# Patient Record
(demographics unavailable — no encounter records)

---

## 2024-10-20 NOTE — REASON FOR VISIT
[Symptom and Test Evaluation] : symptom and test evaluation [Cardiac Failure] : cardiac failure [Arrhythmia/ECG Abnorrmalities] : arrhythmia/ECG abnormalities [Structural Heart and Valve Disease] : structural heart and valve disease [Hypertension] : hypertension [Other: ____] : [unfilled] [Formal Caregiver] : formal caregiver

## 2024-10-21 NOTE — HISTORY OF PRESENT ILLNESS
[FreeTextEntry1] : Patient who is mentally challenged with hx of ,Abnormal EKG, repaired Ventricular septal defect , hypertension ,cardiomyopathy with severe systolic dysfunction, normal coronaries sep 2019 ,AICD  july 16 2020 brought in for follow up  says she is feeling fine , does not eat much , patient is not very communicative ,, does not sleep well as per caregiver ,  patient recent AICD interrogation showed  45 minutes episode of atrial fibrillation , with controlled rate ,  ( patient has always she mild ache at sight of AICD placement location , which is chronic ,)  her weight stable , but denies any orthopnea , taking lasix 40 mg po daily    Patient had echo showed   EF 25-30%   increased RVSP compared to prior    denies any orthopnea   cardiac catheterization showed normal coronaries with severely decreased EF, increase pulmonary pressure,  Patient blood pressure improved on increased coreg dose   her blood work done in  May 2024  normal lipids , HB a1c 5.6   AICD was interrogated on May 17 2024   under the care of hematologist for anemia, receiving iron injection her hemoglobin  9.4 decreased from 10.7   MCV 77

## 2024-10-21 NOTE — ASSESSMENT
[FreeTextEntry1] : PAF : recently detected on AICD interrogation 45 minutes long high chadvasc score : continue coreg , start on eliquis 5 mg po BID , discontinue ecotrin   ( discussed with dr RAINA Quintanilla )     Severe Left ventricular systolic dysfunction prior VSD repair :  worsened  EF  , without much clinical evidence of CHF ,with normal coronaries , possibly due to cardiomyopathy and hypertension s/p AICD placement , Continue coreg BID ,  continue entresto  49/52 mg  po BID  ( hold if sbp<100 )  continue lasix 40 mg po daily    home BP Monitor  , weight monitor , AICD periodic testing of function       HTN: Blood pressure controlled   Recommend to continue  low salt diet ,entresto ,   coreg  25 mg BID. ,Continue to monitor blood pressure at home. Hold blood pressure medications if SBP < 100.     Moderate pulmonary hypertension:   likely secondary pulmonary hypertension, continue  Coreg , Entresto ,lasix 40 mg   Hyperlipidemia: controlled ,Continue current medications , with diet restriction ,  follow up lipid profile   H/O: VSD repair : Systolic ejection murmur  SBE prophylaxis ,

## 2024-10-21 NOTE — CARDIOLOGY SUMMARY
[de-identified] : 9/23/24 atrial sense ventricular paced rhythm  [de-identified] : 8/5/2022   TDS   EF 25-30% Mild to moderate TR 39 mm hg   Moderate MR mild PI  moderate pulmonary hypertension 54 mm hg worse than before  4/30/24  Dilated LV severe LV dysfunction 25-30% septal abnormal motion ,  Normal RV size function , Moderate MR , mild to moderate TR  [de-identified] : AICD 7/16/20 [employer]onic Dual  [de-identified] : 9/6/19  normal coronaries ,  PCW 36 PA P67/23 mm hg

## 2024-10-21 NOTE — CARDIOLOGY SUMMARY
[de-identified] : 9/23/24 atrial sense ventricular paced rhythm  [de-identified] : 8/5/2022   TDS   EF 25-30% Mild to moderate TR 39 mm hg   Moderate MR mild PI  moderate pulmonary hypertension 54 mm hg worse than before  4/30/24  Dilated LV severe LV dysfunction 25-30% septal abnormal motion ,  Normal RV size function , Moderate MR , mild to moderate TR  [de-identified] : AICD 7/16/20 [employer]onic Dual  [de-identified] : 9/6/19  normal coronaries ,  PCW 36 PA P67/23 mm hg

## 2024-10-21 NOTE — ASSESSMENT
[FreeTextEntry1] : PAF : recently detected on AICD interrogation 45 minutes long high chadvasc score : continue coreg , start on eliquis 5 mg po BID , discontinue ecotrin   ( discussed with dr RAINA Quintanilla )     Severe Left ventricular systolic dysfunction prior VSD repair :  worsened  EF  , without much clinical evidence of CHF ,with normal coronaries , possibly due to cardiomyopathy and hypertension s/p AICD placement , Continue coreg BID ,  continue entresto  49/52 mg  po BID  ( hold if sbp<100 )  continue lasix 40 mg po daily    home BP Monitor  , weight monitor , AICD periodic testing of function       HTN: Blood pressure controlled   Recommend to continue  low salt diet ,entresto ,   coreg  25 mg BID. ,Continue to monitor blood pressure at home. Hold blood pressure medications if SBP < 100.     Moderate pulmonary hypertension:   likely secondary pulmonary hypertension, continue  Coreg , Entresto ,lasix 40 mg   Hyperlipidemia: controlled ,Continue current medications , with diet restriction ,  follow up lipid profile   H/O: VSD repair : Systolic ejection murmur  SBE prophylaxis ,          DR Ramirez

## 2024-10-21 NOTE — PHYSICAL EXAM
[Well Developed] : well developed [Well Nourished] : well nourished [No Acute Distress] : no acute distress [Normal Conjunctiva] : normal conjunctiva [Normal Venous Pressure] : normal venous pressure [No Carotid Bruit] : no carotid bruit [Normal S1, S2] : normal S1, S2 [Murmur] : murmur [Normal Rate] : normal [Normal S1] : normal S1 [Normal S2] : normal S2 [No Murmur] : no murmurs heard [II] : a grade 2 [No Pitting Edema] : no pitting edema present [2+] : left 2+ [No Abnormalities] : the abdominal aorta was not enlarged and no bruit was heard [Clear Lung Fields] : clear lung fields [Good Air Entry] : good air entry [No Respiratory Distress] : no respiratory distress  [Soft] : abdomen soft [Non Tender] : non-tender [Normal Bowel Sounds] : normal bowel sounds [Normal Gait] : normal gait [No Edema] : no edema [No Cyanosis] : no cyanosis [No Clubbing] : no clubbing [No Varicosities] : no varicosities [No Rash] : no rash [No Skin Lesions] : no skin lesions [Moves all extremities] : moves all extremities [No Focal Deficits] : no focal deficits [Normal Speech] : normal speech [Alert and Oriented] : alert and oriented [Normal memory] : normal memory [S3] : no S3 [S4] : no S4 [Right Carotid Bruit] : no bruit heard over the right carotid [Left Carotid Bruit] : no bruit heard over the left carotid [Right Femoral Bruit] : no bruit heard over the right femoral artery [Left Femoral Bruit] : no bruit heard over the left femoral artery

## 2024-10-21 NOTE — REVIEW OF SYSTEMS
[Hearing Loss] : hearing loss [Negative] : Eyes [Blurry Vision] : no blurred vision [Sore Throat] : no sore throat [SOB] : no shortness of breath [Dyspnea on exertion] : not dyspnea during exertion [Lower Ext Edema] : no extremity edema [Palpitations] : no palpitations [Syncope] : no syncope [Cough] : no cough [Abdominal Pain] : no abdominal pain [Dysuria] : no dysuria [Myalgia] : no myalgia [Rash] : no rash [Dizziness] : no dizziness

## 2025-02-03 NOTE — HISTORY OF PRESENT ILLNESS
[FreeTextEntry1] : Patient who is mentally challenged with hx of ,Abnormal EKG, repaired Ventricular septal defect , hypertension ,cardiomyopathy with severe systolic dysfunction, normal coronaries sep 2019 ,AICD  july 16 2020 brought in for follow up  says she is feeling fine ,  patient is not very communicative ,, does not sleep well as per caregiver ,  patient recent AICD interrogation on Dec 10 2024 showed 3 hrs  episode of atrial fibrillation , with controlled rate ,  ( patient has always she mild ache at sight of AICD placement location , which is chronic ,)  her weight stable , but denies any orthopnea , taking lasix 40 mg po daily    Patient had echo showed   EF 25-30%   increased RVSP compared to prior    denies any orthopnea   cardiac catheterization showed normal coronaries with severely decreased EF, increase pulmonary pressure,  Patient blood pressure improved on increased coreg dose   her blood work done in  May 2024  normal lipids , HB a1c 5.6   AICD was interrogated on Dec 10  2024   under the care of hematologist for anemia, receiving iron injection her hemoglobin  9.4 decreased from 10.7   MCV 77

## 2025-02-03 NOTE — CARDIOLOGY SUMMARY
[de-identified] : 2/3/25 atrial sense ventricular paced rhythm  [de-identified] : 8/5/2022   TDS   EF 25-30% Mild to moderate TR 39 mm hg   Moderate MR mild PI  moderate pulmonary hypertension 54 mm hg worse than before  4/30/24  Dilated LV severe LV dysfunction 25-30% septal abnormal motion ,  Normal RV size function , Moderate MR , mild to moderate TR  RVSP 32 mm hg  [de-identified] : AICD 7/16/20 [employer]onic Dual  [de-identified] : 9/6/19  normal coronaries ,  PCW 36 PA P67/23 mm hg

## 2025-02-03 NOTE — ASSESSMENT
[FreeTextEntry1] : PAF : recently detected on AICD interrogation 3hour long afib episode ,  high chadvasc score : continue coreg , continue  eliquis 5 mg po BID , discontinue ecotrin      Severe Left ventricular systolic dysfunction prior VSD repair :  worsened  EF  , without much clinical evidence of CHF ,with normal coronaries , possibly due to cardiomyopathy and hypertension s/p AICD placement , Continue coreg BID ,  continue entresto  49/52 mg  po BID  ( hold if sbp<100 )  continue lasix 40 mg po daily    home BP Monitor  , weight monitor , AICD periodic testing of function    will obtain follow up echo during next visit     HTN: Blood pressure controlled   Recommend to continue  low salt diet ,entresto ,   coreg  25 mg BID. ,Continue to monitor blood pressure at home. Hold blood pressure medications if SBP < 100.     Moderate pulmonary hypertension:   likely secondary pulmonary hypertension follow up echo showed improved  pressure , , continue  Coreg , Entresto ,lasix 40 mg   Hyperlipidemia: controlled ,Continue current medications , with diet restriction ,  follow up lipid profile   H/O: VSD repair : Systolic ejection murmur  SBE prophylaxis ,

## 2025-06-09 NOTE — REVIEW OF SYSTEMS
[Hearing Loss] : hearing loss [Negative] : Eyes [Blurry Vision] : no blurred vision [Sore Throat] : no sore throat [SOB] : no shortness of breath [Dyspnea on exertion] : not dyspnea during exertion [Lower Ext Edema] : no extremity edema [Palpitations] : no palpitations [Syncope] : no syncope [Cough] : no cough [Abdominal Pain] : no abdominal pain [Dysuria] : no dysuria [Rash] : no rash [Myalgia] : no myalgia [Dizziness] : no dizziness

## 2025-06-09 NOTE — ASSESSMENT
[FreeTextEntry1] : PAF : recently detected on AICD interrogation 3hour long afib episode ,  high chadvasc score : continue coreg , continue  eliquis 5 mg po BID ,     Severe Left ventricular systolic dysfunction prior VSD repair :  worsened  EF  , without much clinical evidence of CHF ,with normal coronaries , possibly due to cardiomyopathy and hypertension s/p AICD placement , Continue coreg BID ,  continue entresto  49/52 mg  po BID  ( hold if sbp<100 )  continue lasix 40 mg po daily    home BP Monitor  , weight monitor , AICD periodic testing of function        HTN: elevated blood pressure today   ??  Recommend to continue  low salt diet ,entresto ,   coreg  25 mg BID. ,Continue to monitor blood pressure at home. Hold blood pressure medications if SBP < 100.  will obtain her recent home BP readings consider adding Aldactone 25 mg po daily   Moderate pulmonary hypertension:   likely secondary pulmonary hypertension follow up echo showed improved  pressure , , continue  Coreg , Entresto ,lasix 40 mg   Hyperlipidemia: controlled ,Continue current medications , with diet restriction ,  follow up lipid profile   H/O: VSD repair : Systolic ejection murmur  SBE prophylaxis ,      follow up after 1 month

## 2025-06-09 NOTE — CARDIOLOGY SUMMARY
[de-identified] : 8/5/2022   TDS   EF 25-30% Mild to moderate TR 39 mm hg   Moderate MR mild PI  moderate pulmonary hypertension 54 mm hg worse than before  4/30/24  Dilated LV severe LV dysfunction 25-30% septal abnormal motion ,  Normal RV size function , Moderate MR , mild to moderate TR  RVSP 32 mm hg  [de-identified] : 2/3/25 atrial sense ventricular paced rhythm  [de-identified] : 9/6/19  normal coronaries ,  PCW 36 PA P67/23 mm hg  [de-identified] : AICD 7/16/20 [employer]tronic Dual

## 2025-06-09 NOTE — HISTORY OF PRESENT ILLNESS
[FreeTextEntry1] : Patient who is mentally challenged with hx of ,Abnormal EKG, repaired Ventricular septal defect , hypertension ,cardiomyopathy with severe systolic dysfunction, normal coronaries sep 2019 ,AICD  july 16 2020 brought in for follow up  says she is feeling fine ,  patient is not very communicative ,, does not sleep well as per caregiver ,   denies any chest pain or sob ,   her blood pressure is elevated , patient recent AICD interrogation on March 2025   patient did have 3 hrs  episode of atrial fibrillation , with controlled rate ,  ( patient has always she mild ache at sight of AICD placement location , which is chronic ,)  her weight stable , but denies any orthopnea , taking lasix 40 mg po daily    Patient had echo showed   EF 25-30%   increased RVSP compared to prior    denies any orthopnea   cardiac catheterization showed normal coronaries with severely decreased EF, increase pulmonary pressure,   her blood work done in  May 2024  normal lipids , HB a1c 5.6   AICD was interrogated on Dec 10  2024   under the care of hematologist for anemia, receiving iron injection her hemoglobin  9.4 decreased from 10.7   MCV 77

## 2025-06-09 NOTE — REVIEW OF SYSTEMS
[Hearing Loss] : hearing loss [Negative] : Eyes [Blurry Vision] : no blurred vision [Sore Throat] : no sore throat [Dyspnea on exertion] : not dyspnea during exertion [SOB] : no shortness of breath [Lower Ext Edema] : no extremity edema [Palpitations] : no palpitations [Syncope] : no syncope [Cough] : no cough [Abdominal Pain] : no abdominal pain [Dysuria] : no dysuria [Rash] : no rash [Myalgia] : no myalgia [Dizziness] : no dizziness

## 2025-07-22 NOTE — PHYSICAL EXAM
Pt resting in room, easy, equal chest rise and fall. Pt remains calm and cooperative under direct obs of designee.     [Well Developed] : well developed [Well Nourished] : well nourished [No Acute Distress] : no acute distress [Normal Conjunctiva] : normal conjunctiva [Normal Venous Pressure] : normal venous pressure [No Carotid Bruit] : no carotid bruit [Normal S1, S2] : normal S1, S2 [Murmur] : murmur [Normal Rate] : normal [Normal S1] : normal S1 [Normal S2] : normal S2 [No Murmur] : no murmurs heard [II] : a grade 2 [No Pitting Edema] : no pitting edema present [2+] : left 2+ [No Abnormalities] : the abdominal aorta was not enlarged and no bruit was heard [Clear Lung Fields] : clear lung fields [Good Air Entry] : good air entry [No Respiratory Distress] : no respiratory distress  [Soft] : abdomen soft [Non Tender] : non-tender [Normal Bowel Sounds] : normal bowel sounds [Normal Gait] : normal gait [No Edema] : no edema [No Cyanosis] : no cyanosis [No Clubbing] : no clubbing [No Varicosities] : no varicosities [No Rash] : no rash [No Skin Lesions] : no skin lesions [Moves all extremities] : moves all extremities [No Focal Deficits] : no focal deficits [Normal Speech] : normal speech [Alert and Oriented] : alert and oriented [Normal memory] : normal memory [S3] : no S3 [S4] : no S4 [Right Carotid Bruit] : no bruit heard over the right carotid [Left Carotid Bruit] : no bruit heard over the left carotid [Right Femoral Bruit] : no bruit heard over the right femoral artery [Left Femoral Bruit] : no bruit heard over the left femoral artery

## 2025-07-22 NOTE — CARDIOLOGY SUMMARY
[de-identified] : 6/9/25 atrial sense ventricular paced rhythm  [de-identified] : 8/5/2022   TDS   EF 25-30% Mild to moderate TR 39 mm hg   Moderate MR mild PI  moderate pulmonary hypertension 54 mm hg worse than before  4/30/24  Dilated LV severe LV dysfunction 25-30% septal abnormal motion ,  Normal RV size function , Moderate MR , mild to moderate TR  RVSP 32 mm hg  6/9/25  EF 35-40% spetal abnormal motion , Moderate MR TR RVSP 30 mm hg  PI   slightly better EF  [de-identified] : LV EF  [de-identified] : AICD 7/16/20 [employer] Dual  [de-identified] : 9/6/19  normal coronaries ,  PCW 36 PA P67/23 mm hg

## 2025-07-22 NOTE — HISTORY OF PRESENT ILLNESS
[FreeTextEntry1] : Patient who is mentally challenged with hx of ,Abnormal EKG, repaired Ventricular septal defect , hypertension ,cardiomyopathy with severe systolic dysfunction, normal coronaries sep 2019 ,AICD  july 16 2020 brought in for follow up  says she is feeling fine ,  patient is not very communicative ,, does not sleep well as per caregiver ,   denies any chest pain or sob ,   her blood pressure is elevated , patient recent AICD interrogation on  June 2025   patient did have 3 hrs  episode of atrial fibrillation , with controlled rate ,  ( patient has always she mild ache at sight of AICD placement location , which is chronic ,)  her weight stable , but denies any orthopnea , taking lasix 40 mg po daily    Patient had  follow up echo showed EF 35-40% improved RVSP moderate MR TR   compared to prior echo which  showed   EF 25-30%   elevated RVSP   cardiac catheterization showed normal coronaries with severely decreased EF, increase pulmonary pressure,   her blood work done in  March 2025  normal lipids LDL 56  , HB a1c 5.6   AICD was interrogated on Kerry 10 2025    under the care of hematologist for anemia, receiving iron injection her hemoglobin  9.4 decreased from 10.7   MCV 77

## 2025-07-22 NOTE — CARDIOLOGY SUMMARY
[de-identified] : 6/9/25 atrial sense ventricular paced rhythm  [de-identified] : 8/5/2022   TDS   EF 25-30% Mild to moderate TR 39 mm hg   Moderate MR mild PI  moderate pulmonary hypertension 54 mm hg worse than before  4/30/24  Dilated LV severe LV dysfunction 25-30% septal abnormal motion ,  Normal RV size function , Moderate MR , mild to moderate TR  RVSP 32 mm hg  6/9/25  EF 35-40% spetal abnormal motion , Moderate MR TR RVSP 30 mm hg  PI   slightly better EF  [de-identified] : LV EF  [de-identified] : AICD 7/16/20 [employer] Dual  [de-identified] : 9/6/19  normal coronaries ,  PCW 36 PA P67/23 mm hg

## 2025-07-22 NOTE — ASSESSMENT
[FreeTextEntry1] : PAF : AICD interrogation  June ,  high chadvasc score : continue coreg , continue  eliquis 5 mg po BID ,     Severe Left ventricular systolic dysfunction prior VSD repair :  Better  EF  35-40% compared to prior , without much clinical evidence of CHF ,with normal coronaries , possibly due to cardiomyopathy and hypertension s/p AICD placement , Continue coreg BID ,  continue entresto  49/52 mg  po BID  ( hold if sbp<100 )  continue lasix 40 mg po daily    home BP Monitor  , weight monitor , AICD periodic testing of function        HTN:  improved bp controlled  Recommend to continue  low salt diet ,entresto ,   coreg  25 mg BID. ,Continue to monitor blood pressure at home. Hold blood pressure medications if SBP < 100.  will add spironolactone 25 mg po daily , hold if sbp<100  (discontinue Potassium supplement ,) blood work after 2 weeks to check K level   Moderate pulmonary hypertension:   likely secondary pulmonary hypertension follow up echo showed improved  pressure , , continue  Coreg , Entresto ,lasix 40 mg   Hyperlipidemia: controlled ,Continue current medications , with diet restriction ,  follow up lipid profile   H/O: VSD repair : Systolic ejection murmur  SBE prophylaxis ,      follow up after 1 month